# Patient Record
(demographics unavailable — no encounter records)

---

## 2025-03-13 NOTE — PHYSICAL EXAM
[de-identified] : Physical exam of his left hand/wrist: Resolving swelling and ecchymosis. The wound is clean and dry. No signs of drainage, pus, or infection.  Good range of motion of the fingers. Sensory and motor are intact.

## 2025-03-13 NOTE — DISCUSSION/SUMMARY
[de-identified] : Sutures were removed.  I recommend Epson salt and warm water soaks with flexion and extension exercises and scar massage.  Patient understands numbness or tingling can persist for up to 6 months. Any residual numbness and tingling after 6 months is permanent.  He will contact the office with any problems.

## 2025-03-13 NOTE — HISTORY OF PRESENT ILLNESS
[de-identified] : Patient is a 91 year M here for his first PO appt. He is status post an OCTR of the left wrist done by Dr. Nguyen. He is doing well.

## 2025-04-10 NOTE — DISCUSSION/SUMMARY
[de-identified] : Sutures were removed.  I recommend Epson salt and warm water soaks with flexion and extension exercises and scar massage.  Patient understands numbness or tingling can persist for up to 6 months. Any residual numbness and tingling after 6 months is permanent.  He will contact the office with any problems.

## 2025-04-10 NOTE — HISTORY OF PRESENT ILLNESS
[de-identified] : Patient is a 91 year M here for his first PO appt. He is status post a right OCTR done by Dr. Nguyen. He is doing well.

## 2025-04-10 NOTE — PHYSICAL EXAM
[de-identified] : Physical exam of his right hand/wrist: Resolving swelling and ecchymosis. The wound is clean and dry. No signs of drainage, pus, or infection.  Good range of motion of the fingers. Sensory and motor are intact.

## 2025-05-08 NOTE — PHYSICAL EXAM
[de-identified] : Wounds are healed there is no signs of any residual suture material there is incisional tenderness is no redness there is no drainage good motion to the fingers

## 2025-05-08 NOTE — ASSESSMENT
[FreeTextEntry1] : Patient had recent right-sided carpal tunnel release done.  L from carpal tunnel surgery but having some incisional tenderness.  The possibility of retained suture material was discussed with the patient.  I think it probably is not that cannot be 100% certain signs that there is any foreign body present who keep an eye on her over the next 2 to 3 months if it persist we will consider ultrasound if is a suture material sometimes it will come to the surface

## 2025-05-08 NOTE — HISTORY OF PRESENT ILLNESS
[de-identified] : 91-year-old male having some incisional tenderness around the right wrist where carpal tunnel surgery was performed.  He feels better in terms of the tingling in his fingers but he is having some pain and discomfort associated around the incision.